# Patient Record
Sex: FEMALE | Race: WHITE | NOT HISPANIC OR LATINO | Employment: PART TIME | URBAN - METROPOLITAN AREA
[De-identification: names, ages, dates, MRNs, and addresses within clinical notes are randomized per-mention and may not be internally consistent; named-entity substitution may affect disease eponyms.]

---

## 2018-12-10 ENCOUNTER — APPOINTMENT (OUTPATIENT)
Dept: GENERAL RADIOLOGY | Facility: CLINIC | Age: 48
End: 2018-12-10
Attending: NURSE PRACTITIONER
Payer: COMMERCIAL

## 2018-12-10 ENCOUNTER — HOSPITAL ENCOUNTER (EMERGENCY)
Facility: CLINIC | Age: 48
Discharge: HOME | End: 2018-12-10
Attending: NURSE PRACTITIONER | Admitting: NURSE PRACTITIONER
Payer: COMMERCIAL

## 2018-12-10 VITALS
DIASTOLIC BLOOD PRESSURE: 53 MMHG | BODY MASS INDEX: 25.76 KG/M2 | TEMPERATURE: 98.3 F | SYSTOLIC BLOOD PRESSURE: 117 MMHG | OXYGEN SATURATION: 100 % | RESPIRATION RATE: 20 BRPM | WEIGHT: 140 LBS | HEIGHT: 62 IN

## 2018-12-10 DIAGNOSIS — S09.90XA CLOSED HEAD INJURY, INITIAL ENCOUNTER: ICD-10-CM

## 2018-12-10 DIAGNOSIS — S00.33XA CONTUSION OF NOSE, INITIAL ENCOUNTER: ICD-10-CM

## 2018-12-10 PROCEDURE — 70160 X-RAY EXAM OF NASAL BONES: CPT

## 2018-12-10 PROCEDURE — 99283 EMERGENCY DEPT VISIT LOW MDM: CPT

## 2018-12-10 ASSESSMENT — MIFFLIN-ST. JEOR: SCORE: 1213.29

## 2018-12-10 ASSESSMENT — ENCOUNTER SYMPTOMS: HEADACHES: 1

## 2018-12-10 NOTE — ED PROVIDER NOTES
"  History     Chief Complaint:  Facial Injury        HPI   Zoey Newton is a 48 year old female who presents alone to the Emergency Department for evaluation of facial injury. She reports that just prior to arrival she collided with her dog playing Frisbee, as the dog collided with the right side of her face. She notes that she \"heard a crack\" upon impact. She localizes pain to the right side of her face with a radiating headache to her right temporal head. She notes that she has seen an ENT in the past prior to today's injury, and reported that she has  deviated septum. The patient also notes of initial \"drainage to the back of her throat\" which did not taste \"bloody\" and is resolved. She denies any epistasis or nasal discharge. She denies any loss of consciousness, vision changes, hearing changes.      Allergies:  No Known Drug Allergies     Medications:    Cetirizine HCl (ZYRTEC ALLERGY PO)    Past Medical History:    Allergic State   Deviated Septum     Past Surgical History:    BREAST SURGERY   SECTION  TONSILLECTOMY    Family History:    family history includes Breast Cancer in her paternal grandmother and sister; Cancer in her paternal grandfather; Heart Disease in her maternal grandfather; Lipids in her father; Respiratory in her maternal grandmother; Thyroid Disease in her mother.    Social History:  Reports that  has never smoked. She does not have any smokeless tobacco history on file. She reports that she drinks alcohol. She reports that she does not use drugs.  PCP: No primary care provider on file.     Review of Systems   HENT: Negative for nosebleeds.    Neurological: Positive for headaches. Negative for syncope.   All other systems reviewed and are negative.      Physical Exam     Patient Vitals for the past 24 hrs:   BP Temp Heart Rate Resp SpO2 Height Weight   12/10/18 1416 117/53 98.3  F (36.8  C) 81 20 100 % 1.575 m (5' 2\") 63.5 kg (140 lb)      Physical Exam  General: Alert. Well " kept.  HEENT:   Head: No facial asymmetry. No palpable scalp hematomas or bony step offs. No frontal or maxillary facial tenderness.   Eyes: Normal conjunctiva. No scleral icterus. PERRLA. EOMI. No raccoon s eyes. No double vision with upward gaze.  Ears: Normal pinnae. Normal external auditory canals. Normal tympanic membranes. No hemotympanum bilaterally. No Curtis's signs.   Nose: No deformity. No nasal drainage.  Small bruise along right nasal bridge.  No septal hematoma.  Throat: Moist mucous membranes. No evidence for intraoral trauma. No posterior oropharynx drainage.  Neck: Supple, no nuchal rigidity. No midline tenderness over cervical spine or paraspinal musculature. Normal range of motion.   Cardiac: Normal rate and regular rhythm. Normal heart sounds.    Pulmonary: CTA bilaterally. Normal breath sounds.   Neuro: GCS 15. Alert and oriented. Cranial nerves II-XII intact. 5/5 strength equal bilateral upper and lower extremities. Gait smooth. Visual fields bilateral without deficit.  MUSCULOSKELETAL: Normal gross range of motion of all 4 extremities. No peripheral edema. No midline tenderness over thoracic, lumbar or sacral spine.   SKIN: Skin is warm and dry. No rashes, petechiae or pallor.    PSYCH: Normal affect and mood. Good eye contact.    Emergency Department Course     Imaging:  XR Nasal Bones 3 Views:   No fracture or osseous lesion is seen. No soft tissue  pathology is seen.  Read per Radiology    Emergency Department Course:  Past medical records, nursing notes, and vitals reviewed.  1458: I performed an exam of the patient and obtained history, as documented above.   I rechecked the patient. Findings and plan explained to the Patient. Patient was discharge.     Impression & Plan      Medical Decision Making:  Zoey Newton is a 48 year old female who presents for evaluation following a head injury.  The injury was mechanical in nature.  The patient is not on any blood thinners.  The patient has  no neurologic deficit.  The patient had no episodes of vomiting. Imaging was discussed and based on risk stratification, patient comfort, and symptoms there is no indication for head CT using [place] [facility] CT guidelines.    Given the mechanism of the injury, the lack of focal neurologic deficit, no LOC, no seizure activity, and negative imaging, I believe serious cranial pathology is unlikely. Symptoms are not consistent with basilar skull fracture. The patient was counseled regarding post-concussive syndrome including cognitive, behavioral and emotional aspects. Neck cleared clinically using NEXUS criteria.   Additionally, the patient is not to participate in strenuous activities for one week or until cleared by a primary care provider. Nasal bone xray negative for fracture and I doubt midface fracture on exam. She was given ENT referral as needed. The patient is comfortable with discharge home and out-patient follow up.    Diagnosis:    ICD-10-CM    1. Contusion of nose, initial encounter S00.33XA    2. Closed head injury, initial encounter S09.90XA       Discharge Medications:  No discharge medications    12/10/2018   BakerJanice byrd, CNP        Janice Alexander, CNP  12/10/18 1839

## 2018-12-10 NOTE — LETTER
December 10, 2018      To Whom It May Concern:      Zoey Newton was seen in our Emergency Department today, 12/10/18. Please excuse Zoey from work on 12/1/2018 due to injury.   She may return to work when improved.    Sincerely,        Janice Alexander, CNP

## 2018-12-10 NOTE — DISCHARGE INSTRUCTIONS
Discharge Instructions  Head Injury    You have been seen today for a head injury. Your evaluation included a history and physical examination. You may have had a CT (CAT) scan performed, though most head injuries do not require a scan. Based on this evaluation, your provider today does not feel that your head injury is serious.    Generally, every Emergency Department visit should have a follow-up clinic visit with either a primary or a specialty clinic/provider. Please follow-up as instructed by your emergency provider today.  Return to the Emergency Department if:  You are confused or you are not acting right.  Your headache gets worse or you start to have a really bad headache even with your recommended treatment plan.  You vomit (throw up) more than once.  You have a seizure.  You have trouble walking.  You have weakness or paralysis (cannot move) in an arm or a leg.  You have blood or fluid coming from your ears or nose.  You have new symptoms or anything that worries you.    Sleeping:  It is okay for you to sleep, but someone should wake you up if instructed by your provider, and someone should check on you at your usual time to wake up.     Activity:  Do not drive for at least 24 hours.  Do not drive if you have dizzy spells or trouble concentrating, or remembering things.  Do not return to any contact sports until cleared by your regular provider.     MORE INFORMATION:    Concussion:  A concussion is a minor head injury that may cause temporary problems with the way the brain works. Although concussions are important, they are generally not an emergency or a reason that a person needs to be hospitalized. Some concussion symptoms include confusion, amnesia (forgetful), nausea (sick to your stomach) and vomiting (throwing up), dizziness, fatigue, memory or concentration problems, irritability and sleep problems. For most people, concussions are mild and temporary but some will have more severe and persistent  symptoms that require on-going care and treatment.  CT Scans: Your evaluation today may have included a CT scan (CAT scan) to look for things like bleeding or a skull fracture (broken bone).  CT scans involve radiation and too many CT scans can cause serious health problems like cancer, especially in children.  Because of this, your provider may not have ordered a CT scan today if they think you are at low risk for a serious or life threatening problem.    If you were given a prescription for medicine here today, be sure to read all of the information (including the package insert) that comes with your prescription.  This will include important information about the medicine, its side effects, and any warnings that you need to know about.  The pharmacist who fills the prescription can provide more information and answer questions you may have about the medicine.  If you have questions or concerns that the pharmacist cannot address, please call or return to the Emergency Department.     Remember that you can always come back to the Emergency Department if you are not able to see your regular provider in the amount of time listed above, if you get any new symptoms, or if there is anything that worries you.

## 2020-06-25 ENCOUNTER — HOSPITAL ENCOUNTER (EMERGENCY)
Facility: CLINIC | Age: 50
End: 2020-06-25
Payer: COMMERCIAL

## 2022-09-12 ENCOUNTER — HOSPITAL ENCOUNTER (EMERGENCY)
Facility: CLINIC | Age: 52
Discharge: HOME | End: 2022-09-12
Attending: EMERGENCY MEDICINE | Admitting: EMERGENCY MEDICINE
Payer: COMMERCIAL

## 2022-09-12 VITALS
HEIGHT: 62 IN | HEART RATE: 73 BPM | TEMPERATURE: 97.3 F | RESPIRATION RATE: 16 BRPM | OXYGEN SATURATION: 97 % | SYSTOLIC BLOOD PRESSURE: 118 MMHG | DIASTOLIC BLOOD PRESSURE: 72 MMHG | BODY MASS INDEX: 25.76 KG/M2 | WEIGHT: 140 LBS

## 2022-09-12 DIAGNOSIS — I47.10 SVT (SUPRAVENTRICULAR TACHYCARDIA) (H): ICD-10-CM

## 2022-09-12 LAB
ALBUMIN SERPL-MCNC: 4.5 G/DL (ref 3.4–5)
ALP SERPL-CCNC: 102 U/L (ref 40–150)
ALT SERPL W P-5'-P-CCNC: 26 U/L (ref 0–50)
ANION GAP SERPL CALCULATED.3IONS-SCNC: 7 MMOL/L (ref 3–14)
AST SERPL W P-5'-P-CCNC: 28 U/L (ref 0–45)
BILIRUB SERPL-MCNC: 0.4 MG/DL (ref 0.2–1.3)
BUN SERPL-MCNC: 16 MG/DL (ref 7–30)
CALCIUM SERPL-MCNC: 9.3 MG/DL (ref 8.5–10.1)
CHLORIDE BLD-SCNC: 107 MMOL/L (ref 94–109)
CO2 SERPL-SCNC: 27 MMOL/L (ref 20–32)
CREAT SERPL-MCNC: 0.9 MG/DL (ref 0.52–1.04)
ERYTHROCYTE [DISTWIDTH] IN BLOOD BY AUTOMATED COUNT: 12.6 % (ref 10–15)
GFR SERPL CREATININE-BSD FRML MDRD: 76 ML/MIN/1.73M2
GLUCOSE BLD-MCNC: 114 MG/DL (ref 70–99)
HCT VFR BLD AUTO: 45.9 % (ref 35–47)
HGB BLD-MCNC: 15.1 G/DL (ref 11.7–15.7)
HOLD SPECIMEN: NORMAL
MCH RBC QN AUTO: 29.8 PG (ref 26.5–33)
MCHC RBC AUTO-ENTMCNC: 32.9 G/DL (ref 31.5–36.5)
MCV RBC AUTO: 91 FL (ref 78–100)
PLATELET # BLD AUTO: 214 10E3/UL (ref 150–450)
POTASSIUM BLD-SCNC: 3.4 MMOL/L (ref 3.4–5.3)
PROT SERPL-MCNC: 8.2 G/DL (ref 6.8–8.8)
RBC # BLD AUTO: 5.07 10E6/UL (ref 3.8–5.2)
SODIUM SERPL-SCNC: 141 MMOL/L (ref 133–144)
TSH SERPL DL<=0.005 MIU/L-ACNC: 1.2 MU/L (ref 0.4–4)
WBC # BLD AUTO: 7.3 10E3/UL (ref 4–11)

## 2022-09-12 PROCEDURE — 82947 ASSAY GLUCOSE BLOOD QUANT: CPT | Performed by: EMERGENCY MEDICINE

## 2022-09-12 PROCEDURE — 36415 COLL VENOUS BLD VENIPUNCTURE: CPT | Performed by: EMERGENCY MEDICINE

## 2022-09-12 PROCEDURE — 93005 ELECTROCARDIOGRAM TRACING: CPT

## 2022-09-12 PROCEDURE — 93005 ELECTROCARDIOGRAM TRACING: CPT | Mod: 76

## 2022-09-12 PROCEDURE — 85014 HEMATOCRIT: CPT | Performed by: EMERGENCY MEDICINE

## 2022-09-12 PROCEDURE — 99284 EMERGENCY DEPT VISIT MOD MDM: CPT

## 2022-09-12 PROCEDURE — 84443 ASSAY THYROID STIM HORMONE: CPT | Performed by: EMERGENCY MEDICINE

## 2022-09-12 ASSESSMENT — ACTIVITIES OF DAILY LIVING (ADL): ADLS_ACUITY_SCORE: 35

## 2022-09-12 NOTE — ED TRIAGE NOTES
Triage Assessment     Row Name 09/12/22 6801       Triage Assessment (Adult)    Airway WDL WDL       Respiratory WDL    Respiratory WDL WDL       Skin Circulation/Temperature WDL    Skin Circulation/Temperature WDL WDL       Cardiac WDL    Cardiac WDL all    Cardiac Rhythm --  SVT       Peripheral/Neurovascular WDL    Peripheral Neurovascular WDL WDL       Cognitive/Neuro/Behavioral WDL    Cognitive/Neuro/Behavioral WDL WDL

## 2022-09-13 NOTE — ED PROVIDER NOTES
"  History   Chief Complaint:  Chest Pain    HPI   Zoey Newton is a 52 year old female with history of SVT who presents for evaluation of palpitations and fast heart rate.  She complains of slight lightheadedness but denies chest pain.  She tried vagal maneuvers at home without success.  Symptoms have been ongoing for about 1 hour and 15 minutes.  She was on the treadmill and symptoms started.    Allergies:  No Known Allergies    Medications:   CALCIUM 500 + D 500-200 MG-IU OR TABS  Cetirizine HCl (ZYRTEC ALLERGY PO)        Past Medical History:    Past Medical History:   Diagnosis Date     Allergic state      NO ACTIVE PROBLEMS      Patient Active Problem List   Diagnosis     Family history of malignant neoplasm of breast        Past Surgical History:    Past Surgical History:   Procedure Laterality Date     BREAST SURGERY       C/SECTION, LOW TRANSVERSE      , Low Transverse      SECTION       TONSILLECTOMY          Family History:    Family History   Problem Relation Age of Onset     Thyroid Disease Mother      Lipids Father      Respiratory Maternal Grandmother         Emphysema     Heart Disease Maternal Grandfather      Breast Cancer Paternal Grandmother      Cancer Paternal Grandfather         liver     Breast Cancer Sister         Diagnosed at age 32 and  at age 34yrs (Her Identical triplet0       Social History:  PCP: [facility]  Presents to the ED with her   Social History     Tobacco Use     Smoking status: Never Smoker   Substance Use Topics     Alcohol use: Yes     Comment: 1x q 2 mo     Drug use: No       Review of Systems  See the HPI, otherwise the rest of the ROS is negative.    Physical Exam     Patient Vitals for the past 24 hrs:   BP Temp Temp src Pulse Resp SpO2 Height Weight   22 118/72 -- -- 73 16 97 % -- --   22 1847 105/56 97.3  F (36.3  C) Oral (!) 180 16 98 % 1.575 m (5' 2\") 63.5 kg (140 lb)     Physical Exam  General: " Alert, No distress. Nontoxic appearance  Head: No signs of trauma.   Mouth/Throat: Oropharynx moist.   Eyes: Conjunctivae are normal. Pupils are equal..   Neck: Normal range of motion.    CV: Appears well perfused.  Tachycardic and regular  Resp: No respiratory distress.   MSK: Normal range of motion. No obvious deformity.   Neuro: The patient is alert and interactive. LEE. Speech normal. GCS 15  Skin: No lesion or sign of trauma noted.   Psych: normal mood and affect. behavior is normal.     Emergency Department Course     ECG #1:  ECG taken at 1852, ECG read at 1855 by Alvaro Araujo MD  Sinus tachycardia  Septal infarct, ager undetermined  Marked ST abnormality, possible inferior subendocardial injury  Marked ST abnormality, possible anterior subendocardial injury  Abnormal ECG  Rate 190 bpm. PA interval 112. QRS duration 72. QT/QTc 230/409. P-R-T axes 82 80 263.     ECG #2:  ECG taken at 1859, ECG read at 1859 by Alvaro Araujo MD  Sinus rhythm with premature atrial complexes  Otherwise normal ECG  Rate 81 bpm. PA interval 160. QRS duration 76. QT/QTc 396/460. P-R-T axes 76 82 72.      Imaging:  No orders to display      Laboratory:  Labs Ordered and Resulted from Time of ED Arrival to Time of ED Departure   COMPREHENSIVE METABOLIC PANEL - Abnormal       Result Value    Sodium 141      Potassium 3.4      Chloride 107      Carbon Dioxide (CO2) 27      Anion Gap 7      Urea Nitrogen 16      Creatinine 0.90      Calcium 9.3      Glucose 114 (*)     Alkaline Phosphatase 102      AST 28      ALT 26      Protein Total 8.2      Albumin 4.5      Bilirubin Total 0.4      GFR Estimate 76     CBC WITH PLATELETS - Normal    WBC Count 7.3      RBC Count 5.07      Hemoglobin 15.1      Hematocrit 45.9      MCV 91      MCH 29.8      MCHC 32.9      RDW 12.6      Platelet Count 214     TSH WITH FREE T4 REFLEX - Normal    TSH 1.20         Interventions:  Medications - No data to display    Emergency Department  Course/Medical Decision Making:  Zoey Newton is a 52 year old female who presents with tachycardia and palpitations. Broad differential diagnosis considered including SVT, sinus tachycardia, re-entrant tachycardia for the narrow complex regular tachycardia that she presents with by ECG.  Signs and symptoms here are consistent with SVT.      Preparations were made for her to undergo chemical cardioversion but the patient self converted just after IV line placement.  She was observed for 1 hour and did not develop symptoms again.  Post-conversion ECG looks excellent.       The patient will be referred to EP cardiology for further evaluation and treatment as outpatient      Diagnosis:    ICD-10-CM    1. SVT (supraventricular tachycardia) (H)  I47.1        Disposition:  Discharged to home.         Alvaro Araujo MD  09/13/22 7221

## 2022-09-13 NOTE — ED NOTES
Pt received via triage  In SVT, hx of same per her apple watch,  Has never been caught on ekg,  SVT here which broke with IV start.   Labs sent, repeat ekg complete.

## 2025-01-07 NOTE — ED AVS SNAPSHOT
Emergency Department  [address] [facility] [address]  Phone:  [phone]  Fax:  [phone]                                    Zoey Newton   MRN: [id number]    Department:   Emergency Department   Date of Visit:  12/10/2018           After Visit Summary Signature Page    I have received my discharge instructions, and my questions have been answered. I have discussed any challenges I see with this plan with the nurse or doctor.    ..........................................................................................................................................  Patient/Patient Representative Signature      ..........................................................................................................................................  Patient Representative Print Name and Relationship to Patient    ..................................................               ................................................  Date                                   Time    ..........................................................................................................................................  Reviewed by Signature/Title    ...................................................              ..............................................  Date                                               Time          22EPIC Rev 08/18        [Nutrition/ Exercise/ Weight Management] : nutrition, exercise, weight management [Vitamins/Supplements] : vitamins/supplements [Breast Self Exam] : breast self exam [Contraception/ Emergency Contraception/ Safe Sexual Practices] : contraception, emergency contraception, safe sexual practices [Medication Management] : medication management